# Patient Record
Sex: MALE | Race: WHITE
[De-identification: names, ages, dates, MRNs, and addresses within clinical notes are randomized per-mention and may not be internally consistent; named-entity substitution may affect disease eponyms.]

---

## 2018-02-20 ENCOUNTER — HOSPITAL ENCOUNTER (EMERGENCY)
Dept: HOSPITAL 88 - ER | Age: 83
Discharge: HOME | End: 2018-02-20
Payer: MEDICARE

## 2018-02-20 VITALS — WEIGHT: 185 LBS | HEIGHT: 68 IN | BODY MASS INDEX: 28.04 KG/M2

## 2018-02-20 DIAGNOSIS — I48.91: ICD-10-CM

## 2018-02-20 DIAGNOSIS — S39.012A: ICD-10-CM

## 2018-02-20 DIAGNOSIS — Z95.810: ICD-10-CM
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
## 2018-02-20 DIAGNOSIS — Z91.81: ICD-10-CM

## 2018-02-20 DIAGNOSIS — S22.080A: Primary | ICD-10-CM
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
## 2018-02-20 PROCEDURE — 72040 X-RAY EXAM NECK SPINE 2-3 VW: CPT

## 2018-02-20 PROCEDURE — 72072 X-RAY EXAM THORAC SPINE 3VWS: CPT

## 2018-02-20 PROCEDURE — 99283 EMERGENCY DEPT VISIT LOW MDM: CPT

## 2018-02-20 PROCEDURE — 72100 X-RAY EXAM L-S SPINE 2/3 VWS: CPT

## 2018-02-20 NOTE — DIAGNOSTIC IMAGING REPORT
Cervical Spine, 3 views    



HISTORY:  Pain.  Fall

COMPARISON:  None.



FINDINGS:

Limited sensitivity for detection of subtle fractures and ligamentous

abnormalities.



On the lateral view, the cervical spine is visualized from the skull base to

T1.

Normal cervical lordosis.

Trace grade 1 anterolisthesis of C4 on C5.

No acute displaced fracture involving the visualized cervical spine.   



Disc Spaces and Uncovertebral Joints:

Moderate disc space narrowing at C3-C4, moderate severe C5 C6 and C6-C7.



Facets:

Moderate multilevel facet arthrosis.





IMPRESSION:

Moderate to severe degenerative changes in cervical spine. No discrete

fractures.



Signed by: Dr. Lenny Lee M.D. on 2/20/2018 8:13 PM

## 2018-02-20 NOTE — DIAGNOSTIC IMAGING REPORT
Lumbar Spine Radiographs:  3 views    



HISTORY:  Pain. Fall

COMPARISON: None available.



DISCUSSION:

Some of the osseous structures are partially obscured by stool and bowel gas.



There are five non-rib bearing lumbar vertebral bodies.

Trace grade 1 retrolisthesis of L3 on L4 and trace grade 1 anterolisthesis of

L4 on L5.



Age-indeterminate compression deformity with 25% height loss anteriorly T12.

Questionable acute fracture line is visualized.

Vertebral plasty material in L1 compression deformity.



Disc Spaces:

Moderate severe disc space narrowing at L3-L4, moderate at L4-L5, moderate

severe at L5-S1.



Facets:

Severe facet arthrosis from L3-L4 through L5-S1.





IMPRESSION:

1.  Age-indeterminate compression deformity of T12 vertebral body. Questionable

acute horizontal fracture line. Recommend MR lumbar spine without contrast for

further evaluation.

2.  L1 compression deformity with vertebroplasty material.



Signed by: Dr. Lenny Lee M.D. on 2/20/2018 8:18 PM

## 2018-02-20 NOTE — DIAGNOSTIC IMAGING REPORT
THORACIC SP 3V - 3 views



HISTORY:  Pain. Fall

COMPARISON: None available.

     

FINDINGS:

Bones:

Mild to moderate compression deformity of T12 vertebral body.

Vertebroplasty material in L1 vertebral body.

Osseous alignment is within normal limits.



Joints:

Mild multilevel disc space narrowing.

Multiple anterior disc osteophytes.



Soft tissues:

Single lead left-sided pacemaker.

Surgical clips in the left upper quadrant abdomen.



IMPRESSION: 

Mild to moderate compression deformity of T12 vertebral body.

Vertebroplasty material in L1 vertebral body.



Signed by: Dr. Lenny Lee M.D. on 2/20/2018 8:15 PM

## 2018-02-23 ENCOUNTER — HOSPITAL ENCOUNTER (OUTPATIENT)
Dept: HOSPITAL 88 - CT | Age: 83
End: 2018-02-23
Attending: FAMILY MEDICINE
Payer: MEDICARE

## 2018-02-23 DIAGNOSIS — S24.109A: ICD-10-CM

## 2018-02-23 DIAGNOSIS — M54.6: Primary | ICD-10-CM

## 2018-02-23 PROCEDURE — 72128 CT CHEST SPINE W/O DYE: CPT

## 2018-02-26 NOTE — DIAGNOSTIC IMAGING REPORT
Examination: CT Thoracic Spine without Contrast

History: Fall. Mid back pain.

Comparison studies: X-ray of the thoracic spine performed February 20, 2018.



Technique: 

Axial images were reformatted obtained through the thoracic spine.

Coronal and sagittal reconstructions obtained from the axial data.

Intravenous contrast: None



Findings:



Alignment: Normal kyphosis.  No scoliosis.

Soft tissues: Atherosclerotic calcification of the aortic arch and thoracic and

proximal abdominal aorta. Partially visualized large cystic lesion exophytic to

the left kidney.

Paraspinal muscles: No abnormalities.



Vertebrae:  

No infection or neoplasm.

Age-indeterminate fracture of the T12 vertebral body with approximately 25%

height loss.

There is evidence of prior percutaneous injection of polymethylmethacrylate

cement into the L1 vertebral body. 



Degenerative changes:

Flowing anterior osteophytosis from T5 through T12..



Visualized lung:

Right apical paraseptal emphysematous change.

Dependent atelectasis bilaterally.



IMPRESSION:



1.  Age-indeterminate fracture of the T12 vertebral body with approximately 25%

height loss, unchanged from x-ray of the thoracic and lumbar spine performed

February 20, 2018.



2.  Prior vertebroplasty/kyphoplasty of the L1 vertebral body.



3.  Partially visualized large cystic lesion, exophytic to the left kidney.





Signed by: Dr. Cira Choudhary M.D. on 2/26/2018 3:07 PM